# Patient Record
Sex: MALE | Employment: FULL TIME | URBAN - METROPOLITAN AREA
[De-identification: names, ages, dates, MRNs, and addresses within clinical notes are randomized per-mention and may not be internally consistent; named-entity substitution may affect disease eponyms.]

---

## 2021-02-11 ENCOUNTER — HOSPITAL ENCOUNTER (OUTPATIENT)
Dept: LAB | Age: 46
Discharge: HOME | End: 2021-02-11

## 2021-02-11 DIAGNOSIS — R05.9 COUGH: ICD-10-CM

## 2021-02-11 DIAGNOSIS — R06.2 WHEEZING: ICD-10-CM

## 2021-02-11 DIAGNOSIS — R93.89 ABNORMAL CT OF THE CHEST: ICD-10-CM

## 2021-02-11 DIAGNOSIS — J45.909 UNCOMPLICATED ASTHMA, UNSPECIFIED ASTHMA SEVERITY, UNSPECIFIED WHETHER PERSISTENT: ICD-10-CM

## 2021-02-11 DIAGNOSIS — R06.02 SOB (SHORTNESS OF BREATH): ICD-10-CM

## 2021-02-11 PROCEDURE — 36415 COLL VENOUS BLD VENIPUNCTURE: CPT

## 2021-02-11 PROCEDURE — 86331 IMMUNODIFFUSION OUCHTERLONY: CPT

## 2021-02-11 PROCEDURE — 86606 ASPERGILLUS ANTIBODY: CPT

## 2021-02-23 LAB
Lab: NORMAL
REFERENCE LAB,REFLB: NORMAL
TEST DESCRIPTION:,ATST: NORMAL

## 2022-07-28 ENCOUNTER — OFFICE VISIT (OUTPATIENT)
Dept: ENDOCRINOLOGY | Age: 47
End: 2022-07-28

## 2022-07-28 VITALS
HEART RATE: 73 BPM | OXYGEN SATURATION: 97 % | BODY MASS INDEX: 21.28 KG/M2 | WEIGHT: 152 LBS | DIASTOLIC BLOOD PRESSURE: 72 MMHG | HEIGHT: 71 IN | SYSTOLIC BLOOD PRESSURE: 100 MMHG

## 2022-07-28 DIAGNOSIS — E27.49 SECONDARY ADRENAL INSUFFICIENCY (HCC): ICD-10-CM

## 2022-07-28 DIAGNOSIS — E03.9 PRIMARY HYPOTHYROIDISM: Primary | ICD-10-CM

## 2022-07-28 PROCEDURE — 99244 OFF/OP CNSLTJ NEW/EST MOD 40: CPT | Performed by: INTERNAL MEDICINE

## 2022-07-28 RX ORDER — RIVAROXABAN 20 MG/1
TABLET, FILM COATED ORAL
COMMUNITY
Start: 2022-07-06

## 2022-07-28 RX ORDER — LEVOTHYROXINE SODIUM 88 UG/1
88 TABLET ORAL DAILY
Qty: 90 TABLET | Refills: 3
Start: 2022-07-28 | End: 2022-07-28 | Stop reason: SDUPTHER

## 2022-07-28 RX ORDER — FUROSEMIDE 20 MG/1
TABLET ORAL
COMMUNITY
Start: 2022-06-03 | End: 2022-07-28

## 2022-07-28 RX ORDER — IPRATROPIUM BROMIDE AND ALBUTEROL SULFATE 2.5; .5 MG/3ML; MG/3ML
3 SOLUTION RESPIRATORY (INHALATION) EVERY 6 HOURS PRN
COMMUNITY
Start: 2022-05-20 | End: 2022-07-28

## 2022-07-28 RX ORDER — PANTOPRAZOLE SODIUM 40 MG/1
40 TABLET, DELAYED RELEASE ORAL DAILY
COMMUNITY
Start: 2022-06-03 | End: 2022-10-20

## 2022-07-28 RX ORDER — SULFAMETHOXAZOLE AND TRIMETHOPRIM 800; 160 MG/1; MG/1
TABLET ORAL
COMMUNITY
Start: 2022-07-11 | End: 2022-10-20

## 2022-07-28 RX ORDER — ONDANSETRON HYDROCHLORIDE 8 MG/1
8 TABLET, FILM COATED ORAL EVERY 8 HOURS
COMMUNITY
Start: 2022-07-20 | End: 2022-07-28

## 2022-07-28 RX ORDER — PREDNISONE 20 MG/1
TABLET ORAL
COMMUNITY
Start: 2022-07-11 | End: 2022-07-28 | Stop reason: SDUPTHER

## 2022-07-28 RX ORDER — UMECLIDINIUM BROMIDE AND VILANTEROL TRIFENATATE 62.5; 25 UG/1; UG/1
POWDER RESPIRATORY (INHALATION)
COMMUNITY
Start: 2022-07-06 | End: 2022-10-20

## 2022-07-28 RX ORDER — FERROUS SULFATE 325(65) MG
325 TABLET ORAL
COMMUNITY
Start: 2022-06-03 | End: 2022-10-20

## 2022-07-28 RX ORDER — LEVOTHYROXINE SODIUM 88 MCG
TABLET ORAL
COMMUNITY
Start: 2022-07-06 | End: 2022-07-28 | Stop reason: SDUPTHER

## 2022-07-28 RX ORDER — PREDNISONE 20 MG/1
40 TABLET ORAL DAILY
Qty: 180 TABLET | Refills: 3
Start: 2022-07-28 | End: 2022-10-20

## 2022-07-28 RX ORDER — LEVOTHYROXINE SODIUM 88 UG/1
88 TABLET ORAL DAILY
Qty: 90 TABLET | Refills: 3 | Status: SHIPPED | OUTPATIENT
Start: 2022-07-28 | End: 2022-10-20 | Stop reason: SDUPTHER

## 2022-07-28 ASSESSMENT — ENCOUNTER SYMPTOMS
ABDOMINAL PAIN: 0
EYE PAIN: 0
DIARRHEA: 0
WHEEZING: 0
COUGH: 0
TROUBLE SWALLOWING: 0
SHORTNESS OF BREATH: 0
CONSTIPATION: 0
VOMITING: 0
VOICE CHANGE: 0

## 2022-07-28 NOTE — PROGRESS NOTES
Alvaro Kaplan MD, [facility]  [address], [address] [facility],5Th [address]  [place], [address]  Phone [phone]  Facsimile [phone]          Larry Nguyen is a 55 y.o. male seen 7/28/2022 at the request of Dr. Ni Davila for the evaluation of adrenal insufficiency        ASSESSMENT AND PLAN:    1. 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 He is currently being treated with high-dose prednisone, although his dose was recently decreased from 60 mg daily to 40 mg daily. He has developed secondary adrenal insufficiency due to suppression of the hypothalamic-pituitary-adrenal axis. His rheumatologist is currently tapering his prednisone. We discussed the difference between pharmacologic treatment of inflammatory disorders and physiologic glucocorticoid replacement. He is currently on a supraphysiologic dose and we discussed that my role will be to ensure that he is on physiologic glucocorticoid replacement as his prednisone is tapered further. I am certainly also happy to assist in weaning his steroids off if his rheumatologist feels that his prednisone can be discontinued in the future. I will have him return in 2 months. - predniSONE (DELTASONE) 20 MG tablet; Take 2 tablets by mouth in the morning. Dispense: 180 tablet; Refill: 3    2. Primary hypothyroidism  I will assess his thyroid function and let him know if his dose needs to be adjusted. - levothyroxine (SYNTHROID) 88 MCG tablet; Take 1 tablet by mouth in the morning. Dispense: 90 tablet; Refill: 3      Follow-up and Dispositions    Return in about 2 months (around 9/28/2022), or Friday afternoon is okay. 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   He is followed by rheumatology at [address] [facility] (Dr. Nichole Keane). Symptoms: Denies significant weight loss or weight gain. His energy level has improved. Denies nausea, vomiting, anorexia, headaches, myalgias, bowel dysfunction. He reports some issues with hypotension and orthostatic dizziness. He is followed by cardiology at [address] [facility]. Treatment: He currently takes prednisone 40 mg daily. He is also receiving Cytoxan every 3 weeks. Imaging:  CT abdomen/pelvis without contrast 5/27/2022: Unremarkable adrenal glands. Labs:  5/28/2022: Cortisol 3.7.  6/17/2022: Cortisol 1.4, ACTH <1.5. Education:  Sick day rules have been reviewed. Patient has been advised to wear a Med Alert bracelet. THYROID DISEASE    Presentation/Diagnosis: Hypothyroidism diagnosed in 5/2022. Symptoms: See review of systems. Treatment: Takes generic in AM correctly. Imaging: None. Labs:  5/27/2022: TSH 7.41, free T4 0.69.  5/29/2022: TSH 11.74, free T4 0.79, free T3 1.7.  6/17/2022: TSH 0.553, thyroid peroxidase antibodies <8.. Past Medical History:   Diagnosis Date    Ascites     Asthma     Eosinophilic granulomatosis with polyangiitis (EGPA) (HCC)     Pericardial effusion     Primary hypothyroidism        Past Surgical History:   Procedure Laterality Date    WISDOM TOOTH EXTRACTION         Allergies:  Bee venom    Medications:  Reviewed in chart.     Social History     Socioeconomic History    Marital status:      Spouse name: Not on file    Number of children: Not on file    Years of education: Not on file    Highest education level: Not on file   Occupational History    Not on file   Tobacco Use    Smoking status: Never    Smokeless tobacco: Never   Substance and Sexual Activity    Alcohol use: Not on file    Drug use: Not on file    Sexual activity: Not on file   Other Topics Concern    Not on file   Social History Narrative    Not on file     Social Determinants of Health     Financial Resource Strain: Not on file Food Insecurity: Not on file   Transportation Needs: Not on file   Physical Activity: Not on file   Stress: Not on file   Social Connections: Not on file   Intimate Partner Violence: Not on file   Housing Stability: Not on file       Family History   Problem Relation Age of Onset    Thyroid Disease Neg Hx        Review of Systems   Constitutional:  Negative for diaphoresis, fatigue and unexpected weight change. HENT:  Negative for trouble swallowing and voice change. Eyes:  Negative for pain and visual disturbance. Respiratory:  Negative for cough, shortness of breath and wheezing. Cardiovascular:  Negative for chest pain, palpitations and leg swelling. Gastrointestinal:  Negative for abdominal pain, constipation, diarrhea and vomiting. He has some reflux from prednisone. Endocrine: Negative for cold intolerance, heat intolerance, polydipsia, polyphagia and polyuria. Genitourinary:  Negative for difficulty urinating and flank pain. Musculoskeletal:  Negative for arthralgias and myalgias. Neurological:  Negative for dizziness, tremors, weakness, light-headedness, numbness and headaches. Hematological:  Negative for adenopathy. Psychiatric/Behavioral:  Positive for sleep disturbance. Negative for dysphoric mood. The patient is not nervous/anxious. Vital Signs:  /72   Pulse 73   Ht 5' 11\" (1.803 m)   Wt 152 lb (68.9 kg)   SpO2 97%   BMI 21.20 kg/m²     Physical Exam  Constitutional:       General: He is not in acute distress. HENT:      Head: Normocephalic and atraumatic. Eyes:      Comments: No proptosis. Neck:      Thyroid: No thyroid mass or thyromegaly. Cardiovascular:      Rate and Rhythm: Normal rate and regular rhythm. Heart sounds: No murmur heard. Pulmonary:      Effort: Pulmonary effort is normal.      Breath sounds: Normal breath sounds. Abdominal:      General: Bowel sounds are normal.      Palpations: Abdomen is soft.       Tenderness: no abdominal tenderness   Musculoskeletal:         General: Normal range of motion. Lymphadenopathy:      Cervical: No cervical adenopathy. Skin:     General: Skin is warm and dry. Neurological:      General: No focal deficit present. Motor: No tremor. Psychiatric:         Mood and Affect: Mood normal.           Orders Placed This Encounter   Procedures    TSH with Reflex     Standing Status:   Future     Standing Expiration Date:   0/58/3997    Basic Metabolic Panel     Standing Status:   Future     Standing Expiration Date:   7/28/2023         Current Outpatient Medications   Medication Sig Dispense Refill    dapagliflozin (FARXIGA) 10 MG tablet Take 1 tablet by mouth every morning      ferrous sulfate (IRON 325) 325 (65 Fe) MG tablet Take 325 mg by mouth every 48 hours      pantoprazole (PROTONIX) 40 MG tablet Take 40 mg by mouth in the morning. XARELTO 20 MG TABS tablet TAKE 1 TABLET BY MOUTH EVERY DAY      sulfamethoxazole-trimethoprim (BACTRIM DS;SEPTRA DS) 800-160 MG per tablet TAKE 1 TABLET BY MOUTH three TIMES WEEKLY      ANORO ELLIPTA 62.5-25 MCG/INH AEPB inhaler INHALE 1 PUFF BY MOUTH ONCE DAILY AT THE SAME TIME EACH DAY      Ascorbic Acid (VITAMIN C PO) Take by mouth      ZINC PO Take by mouth      Multiple Vitamin (MULTIVITAMIN PO) Take by mouth      GARLIC PO Take by mouth      OLIVE LEAF EXTRACT PO Take by mouth      GRAPE SEED EXTRACT PO Take by mouth      predniSONE (DELTASONE) 20 MG tablet Take 2 tablets by mouth in the morning. 180 tablet 3    levothyroxine (SYNTHROID) 88 MCG tablet Take 1 tablet by mouth in the morning. 90 tablet 3     No current facility-administered medications for this visit.

## 2022-08-03 ENCOUNTER — TELEPHONE (OUTPATIENT)
Dept: ENDOCRINOLOGY | Age: 47
End: 2022-08-03

## 2022-08-03 NOTE — TELEPHONE ENCOUNTER
Patient requested lab order be sent to [facility] fax number [phone] and they have received his order.

## 2022-10-20 ENCOUNTER — OFFICE VISIT (OUTPATIENT)
Dept: ENDOCRINOLOGY | Age: 47
End: 2022-10-20

## 2022-10-20 VITALS
WEIGHT: 161 LBS | BODY MASS INDEX: 22.54 KG/M2 | SYSTOLIC BLOOD PRESSURE: 86 MMHG | HEART RATE: 96 BPM | HEIGHT: 71 IN | DIASTOLIC BLOOD PRESSURE: 58 MMHG | OXYGEN SATURATION: 97 %

## 2022-10-20 DIAGNOSIS — E27.49 SECONDARY ADRENAL INSUFFICIENCY (HCC): Primary | ICD-10-CM

## 2022-10-20 DIAGNOSIS — E03.9 PRIMARY HYPOTHYROIDISM: ICD-10-CM

## 2022-10-20 PROCEDURE — 99214 OFFICE O/P EST MOD 30 MIN: CPT | Performed by: INTERNAL MEDICINE

## 2022-10-20 RX ORDER — ALBUTEROL SULFATE 90 UG/1
AEROSOL, METERED RESPIRATORY (INHALATION)
COMMUNITY
Start: 2022-09-16

## 2022-10-20 RX ORDER — PREDNISONE 1 MG/1
10 TABLET ORAL
COMMUNITY
Start: 2022-09-16 | End: 2022-10-20 | Stop reason: SDUPTHER

## 2022-10-20 RX ORDER — LEVOTHYROXINE SODIUM 88 MCG
88 TABLET ORAL DAILY
COMMUNITY
End: 2022-10-20

## 2022-10-20 RX ORDER — SPIRONOLACTONE 25 MG/1
TABLET ORAL
COMMUNITY
Start: 2022-08-05

## 2022-10-20 RX ORDER — LEVOTHYROXINE SODIUM 88 UG/1
88 TABLET ORAL DAILY
Qty: 90 TABLET | Refills: 3 | Status: SHIPPED | OUTPATIENT
Start: 2022-10-20

## 2022-10-20 RX ORDER — PREDNISONE 1 MG/1
10 TABLET ORAL DAILY
Qty: 180 TABLET | Refills: 3
Start: 2022-10-20

## 2022-10-20 RX ORDER — FLUTICASONE FUROATE, UMECLIDINIUM BROMIDE AND VILANTEROL TRIFENATATE 200; 62.5; 25 UG/1; UG/1; UG/1
POWDER RESPIRATORY (INHALATION)
COMMUNITY
Start: 2022-09-07

## 2022-10-20 ASSESSMENT — ENCOUNTER SYMPTOMS: SHORTNESS OF BREATH: 1

## 2022-10-20 NOTE — PROGRESS NOTES
Alvaro Peters MD, [facility] Endocrinology and Thyroid [facility]od[facility]  [address], [address] [facility],5Th Floor  [place], [address]  Phone [phone]          Tramaine Storey is a 55 y.o. male seen 10/20/2022 for follow up of adrenal insufficiency and hypothyroidism        ASSESSMENT AND PLAN:    1. 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 He has been treated with high-dose prednisone and developed secondary adrenal insufficiency due to suppression of the hypothalamic-pituitary-adrenal axis. His rheumatologist is tapering his prednisone and he is currently taking 10 mg daily. He is scheduled to decrease his prednisone to 5 mg daily in a couple of weeks. He states that his rheumatologist is going to have him try stopping his prednisone after he has been on 5 mg daily for 3 weeks. Once he stops prednisone, I will check an 8 AM cortisol and ACTH level to ensure that his hypothalamic hypopituitary-adrenal axis has recovered. We have discussed the difference between pharmacologic treatment of inflammatory disorders and physiologic glucocorticoid replacement. - predniSONE (DELTASONE) 5 MG tablet; Take 2 tablets by mouth daily  Dispense: 180 tablet; Refill: 3    2. Primary hypothyroidism  Biochemically euthyroid 8/2022. He is interested in stopping thyroid hormone replacement if possible and we discussed that this can be addressed after he gets off prednisone.    - levothyroxine (SYNTHROID) 88 MCG tablet; Take 1 tablet by mouth Daily  Dispense: 90 tablet; Refill: 3      Follow-up and Dispositions    Return in about 6 weeks (around 12/1/2022), or Friday afternoon is okay. 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   He is followed by rheumatology at [address] (Dr. Jia Condon). Symptoms: Weight increased 9 pounds since last visit. His energy level is good overall. He is not sleeping well. Denies nausea, vomiting, anorexia, headaches, myalgias, bowel dysfunction. He reports some issues with hypotension and orthostatic dizziness. He has some dizziness after Cytoxan infusions. He is followed by cardiology in [place]. Treatment: He currently takes prednisone 10 mg daily. He will decrease prednisone to 5 mg daily in 2 weeks and he is scheduled to take it for 3 weeks. He is also receiving Cytoxan every 3 weeks. Imaging:  CT abdomen/pelvis without contrast 5/27/2022: Unremarkable adrenal glands. Labs:  5/28/2022: Cortisol 3.7.  6/17/2022: Cortisol 1.4, ACTH <1.5.  8/3/2022: Sodium 142, potassium 4.8, creatinine 0.84 (). 10/12/2022: Sodium 145, potassium 4.1, creatinine 1.0 (GFR >60), glucose 112. Education:  Sick day rules have been reviewed. Patient has been advised to wear a Med Alert bracelet. THYROID DISEASE    Presentation/Diagnosis: Hypothyroidism diagnosed in 5/2022. Symptoms: See review of systems. Treatment: Takes generic in AM correctly. Imaging: None. Labs:  5/27/2022: TSH 7.41, free T4 0.69.  5/29/2022: TSH 11.74, free T4 0.79, free T3 1.7.  6/17/2022: TSH 0.553, thyroid peroxidase antibodies <8.  8/3/2022: TSH 0.544. Review of Systems   Respiratory:  Positive for shortness of breath ([place]RIGUEZ). Cardiovascular:  Negative for chest pain and leg swelling. Vital Signs:  BP (!) 86/58   Pulse 96   Ht 5' 11\" (1.803 m)   Wt 161 lb (73 kg)   SpO2 97%   BMI 22.45 kg/m²     Wt Readings from Last 3 Encounters:   10/20/22 161 lb (73 kg)   07/28/22 152 lb (68.9 kg)   03/09/21 157 lb 9.6 oz (71.5 kg)       Physical Exam  Constitutional:       General: He is not in acute distress. Neck:      Thyroid: No thyroid mass or thyromegaly.    Cardiovascular:      Rate and Rhythm: Normal rate and regular rhythm. Lymphadenopathy:      Cervical: No cervical adenopathy. Neurological:      Motor: No tremor. Orders Placed This Encounter   Procedures    Cortisol AM, Total     Standing Status:   Future     Standing Expiration Date:   10/20/2023    ACTH     Standing Status:   Future     Standing Expiration Date:   67/20/6514    Basic Metabolic Panel     Standing Status:   Future     Standing Expiration Date:   10/20/2023    TSH     Standing Status:   Future     Standing Expiration Date:   10/20/2023    T4, Free     Standing Status:   Future     Standing Expiration Date:   10/20/2023           Current Outpatient Medications   Medication Sig Dispense Refill    albuterol sulfate HFA (PROVENTIL;VENTOLIN;PROAIR) 108 (90 Base) MCG/ACT inhaler INHALE 2 PUFFS INTO THE LUNGS EVERY 4 HOURS AS NEEDED FOR SHORTNESS OF BREATH      TRELEGY ELLIPTA 200-62.5-25 MCG/INH AEPB INHALE 1 PUFF BY MOUTH ONCE DAILY AT THE SAME TIME EACH DAY (STOP Anoro)      spironolactone (ALDACTONE) 25 MG tablet TAKE 1/2 TABLET BY MOUTH DAILY      predniSONE (DELTASONE) 5 MG tablet Take 2 tablets by mouth daily 180 tablet 3    levothyroxine (SYNTHROID) 88 MCG tablet Take 1 tablet by mouth Daily 90 tablet 3    dapagliflozin (FARXIGA) 10 MG tablet Take 1 tablet by mouth every morning      XARELTO 20 MG TABS tablet TAKE 1 TABLET BY MOUTH EVERY DAY      Ascorbic Acid (VITAMIN C PO) Take by mouth      ZINC PO Take by mouth      GARLIC PO Take by mouth      OLIVE LEAF EXTRACT PO Take by mouth      GRAPE SEED EXTRACT PO Take by mouth       No current facility-administered medications for this visit.

## 2023-01-04 ENCOUNTER — OFFICE VISIT (OUTPATIENT)
Dept: ENDOCRINOLOGY | Age: 48
End: 2023-01-04

## 2023-01-04 VITALS
HEART RATE: 75 BPM | SYSTOLIC BLOOD PRESSURE: 104 MMHG | BODY MASS INDEX: 22.82 KG/M2 | HEIGHT: 71 IN | WEIGHT: 163 LBS | OXYGEN SATURATION: 99 % | DIASTOLIC BLOOD PRESSURE: 62 MMHG

## 2023-01-04 DIAGNOSIS — E27.49 SECONDARY ADRENAL INSUFFICIENCY (HCC): ICD-10-CM

## 2023-01-04 DIAGNOSIS — E03.9 PRIMARY HYPOTHYROIDISM: ICD-10-CM

## 2023-01-04 PROCEDURE — 99214 OFFICE O/P EST MOD 30 MIN: CPT | Performed by: INTERNAL MEDICINE

## 2023-01-04 RX ORDER — AZATHIOPRINE 50 MG/1
100 TABLET ORAL DAILY
COMMUNITY

## 2023-01-04 RX ORDER — MEPOLIZUMAB 100 MG/ML
300 INJECTION, SOLUTION SUBCUTANEOUS ONCE
COMMUNITY

## 2023-01-04 RX ORDER — LEVOTHYROXINE SODIUM 88 UG/1
88 TABLET ORAL DAILY
Qty: 90 TABLET | Refills: 3 | Status: SHIPPED | OUTPATIENT
Start: 2023-01-04

## 2023-01-04 RX ORDER — FLUTICASONE PROPIONATE 93 UG/1
1 SPRAY, METERED NASAL 2 TIMES DAILY
COMMUNITY

## 2023-01-04 RX ORDER — PREDNISONE 1 MG/1
7.5 TABLET ORAL DAILY
Qty: 135 TABLET | Refills: 3
Start: 2023-01-04

## 2023-01-04 ASSESSMENT — ENCOUNTER SYMPTOMS
COUGH: 1
RHINORRHEA: 1
SHORTNESS OF BREATH: 0

## 2023-01-04 NOTE — PROGRESS NOTES
Alvaro Hood MD, [facility] Endocrinology and Thy[facility]odule [facility]  [address], [address] [facility],5[address] Floor  [place], [address]  Phone [phone]          Silvana Chavez is a 52 y.o. male seen 1/4/2023 for follow up of adrenal insufficiency and hypothyroidism        ASSESSMENT AND PLAN:    1. 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 He has been treated with high-dose prednisone and developed secondary adrenal insufficiency due to suppression of the hypothalamic-pituitary-adrenal axis. His rheumatologist is tapering his prednisone and he is currently taking 7.5 mg daily. Once he is taking prednisone 5 mg daily, I will check an 8 AM cortisol and ACTH level to ensure that his hypothalamic hypopituitary-adrenal axis has recovered. He was instructed to hold his prednisone the morning of his labs. We have discussed the difference between pharmacologic treatment of inflammatory disorders and physiologic glucocorticoid replacement. - predniSONE (DELTASONE) 5 MG tablet; Take 1.5 tablets by mouth daily  Dispense: 135 tablet; Refill: 3    2. Primary hypothyroidism  Biochemically euthyroid 8/2022. He is interested in stopping thyroid hormone replacement if possible and we discussed that this can be addressed after he gets off prednisone.    - levothyroxine (SYNTHROID) 88 MCG tablet; Take 1 tablet by mouth Daily  Dispense: 90 tablet; Refill: 3      Follow-up and Dispositions    Return in about 3 months (around 4/4/2023), or Friday afternoon is okay. 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 He is followed by rheumatology at [facility] (Dr. Augusto Vieira). Symptoms: Weight increased 2 pounds since last visit.   Denies fatigue, nausea, vomiting, anorexia, headaches, myalgias, bowel dysfunction, hypotension, orthostatic dizziness. He is followed by cardiology in [facility]. Treatment: He currently takes prednisone 7.5 mg daily (2-3 weeks). He stopped Cytoxan. He is now on azathioprine and Nucala injections. Imaging:  CT abdomen/pelvis without contrast 5/27/2022: Unremarkable adrenal glands. Labs:  5/28/2022: Cortisol 3.7.  6/17/2022: Cortisol 1.4, ACTH <1.5.  8/3/2022: Sodium 142, potassium 4.8, creatinine 0.84 (). 10/12/2022: Sodium 145, potassium 4.1, creatinine 1.0 (GFR >60), glucose 112. Education:  Sick day rules have been reviewed. Patient has been advised to wear a Med Alert bracelet. THYROID DISEASE    Presentation/Diagnosis: Hypothyroidism diagnosed in 5/2022. Symptoms: See review of systems. Treatment: Takes generic in AM correctly. Imaging: None. Labs:  5/27/2022: TSH 7.41, free T4 0.69.  5/29/2022: TSH 11.74, free T4 0.79, free T3 1.7.  6/17/2022: TSH 0.553, thyroid peroxidase antibodies <8.  8/3/2022: TSH 0.544. Review of Systems   HENT:  Positive for rhinorrhea. Respiratory:  Positive for cough. Negative for shortness of breath. Cardiovascular:  Negative for chest pain and leg swelling. Vital Signs:  /62   Pulse 75   Ht 5' 11\" (1.803 m)   Wt 163 lb (73.9 kg)   SpO2 99%   BMI 22.73 kg/m²     Wt Readings from Last 3 Encounters:   01/04/23 163 lb (73.9 kg)   10/20/22 161 lb (73 kg)   07/28/22 152 lb (68.9 kg)       Physical Exam  Constitutional:       General: He is not in acute distress. Neck:      Thyroid: No thyroid mass or thyromegaly. Cardiovascular:      Rate and Rhythm: Normal rate and regular rhythm. Lymphadenopathy:      Cervical: No cervical adenopathy. Neurological:      Motor: No tremor. No orders of the defined types were placed in this encounter.           Current Outpatient Medications   Medication Sig Dispense Refill    mepolizumab (NUCALA) 100 MG/ML SOAJ injection Inject 300 mg into the skin once Indications: every 4 weeks      azaTHIOprine (IMURAN) 50 MG tablet Take 100 mg by mouth daily      Fluticasone Propionate (XHANCE) 93 MCG/ACT EXHU 1 puff by Nasal route 2 times daily      predniSONE (DELTASONE) 5 MG tablet Take 1.5 tablets by mouth daily 135 tablet 3    levothyroxine (SYNTHROID) 88 MCG tablet Take 1 tablet by mouth Daily 90 tablet 3    albuterol sulfate HFA (PROVENTIL;VENTOLIN;PROAIR) 108 (90 Base) MCG/ACT inhaler INHALE 2 PUFFS INTO THE LUNGS EVERY 4 HOURS AS NEEDED FOR SHORTNESS OF BREATH      TRELEGY ELLIPTA 200-62.5-25 MCG/INH AEPB INHALE 1 PUFF BY MOUTH ONCE DAILY AT THE SAME TIME EACH DAY (STOP Anoro)      spironolactone (ALDACTONE) 25 MG tablet TAKE 1/2 TABLET BY MOUTH DAILY      dapagliflozin (FARXIGA) 10 MG tablet Take 1 tablet by mouth every morning      XARELTO 20 MG TABS tablet TAKE 1 TABLET BY MOUTH EVERY DAY      Ascorbic Acid (VITAMIN C PO) Take by mouth      ZINC PO Take by mouth      GARLIC PO Take by mouth      OLIVE LEAF EXTRACT PO Take by mouth      GRAPE SEED EXTRACT PO Take by mouth       No current facility-administered medications for this visit.

## 2023-02-09 LAB
ACTH PLAS-MCNC: 21.5 PG/ML (ref 7.2–63.3)
BUN SERPL-MCNC: 17 MG/DL (ref 6–24)
BUN/CREAT SERPL: 18 (ref 9–20)
CALCIUM SERPL-MCNC: 9.5 MG/DL (ref 8.7–10.2)
CHLORIDE SERPL-SCNC: 102 MMOL/L (ref 96–106)
CO2 SERPL-SCNC: 26 MMOL/L (ref 20–29)
CORTIS AM PEAK SERPL-MCNC: 8.7 UG/DL (ref 6.2–19.4)
CREAT SERPL-MCNC: 0.94 MG/DL (ref 0.76–1.27)
EGFRCR SERPLBLD CKD-EPI 2021: 101 ML/MIN/1.73
GLUCOSE SERPL-MCNC: 73 MG/DL (ref 70–99)
POTASSIUM SERPL-SCNC: 4.3 MMOL/L (ref 3.5–5.2)
SODIUM SERPL-SCNC: 143 MMOL/L (ref 134–144)
T4 FREE SERPL-MCNC: 1.23 NG/DL (ref 0.82–1.77)
TSH SERPL DL<=0.005 MIU/L-ACNC: 1.86 UIU/ML (ref 0.45–4.5)

## 2023-05-08 ENCOUNTER — TELEPHONE (OUTPATIENT)
Dept: ENDOCRINOLOGY | Age: 48
End: 2023-05-08

## 2023-05-08 ENCOUNTER — OFFICE VISIT (OUTPATIENT)
Dept: ENDOCRINOLOGY | Age: 48
End: 2023-05-08

## 2023-05-08 VITALS
DIASTOLIC BLOOD PRESSURE: 80 MMHG | BODY MASS INDEX: 23.43 KG/M2 | HEART RATE: 75 BPM | SYSTOLIC BLOOD PRESSURE: 108 MMHG | WEIGHT: 168 LBS | OXYGEN SATURATION: 98 %

## 2023-05-08 DIAGNOSIS — E27.49 SECONDARY ADRENAL INSUFFICIENCY (HCC): Primary | ICD-10-CM

## 2023-05-08 DIAGNOSIS — E03.9 PRIMARY HYPOTHYROIDISM: ICD-10-CM

## 2023-05-08 DIAGNOSIS — E27.49 SECONDARY ADRENAL INSUFFICIENCY (HCC): ICD-10-CM

## 2023-05-08 LAB
ANION GAP SERPL CALC-SCNC: 5 MMOL/L (ref 2–11)
BUN SERPL-MCNC: 21 MG/DL (ref 6–23)
CALCIUM SERPL-MCNC: 9 MG/DL (ref 8.3–10.4)
CHLORIDE SERPL-SCNC: 105 MMOL/L (ref 101–110)
CO2 SERPL-SCNC: 29 MMOL/L (ref 21–32)
CORTIS AM PEAK SERPL-MCNC: 4.3 UG/DL (ref 7–25)
CREAT SERPL-MCNC: 1.1 MG/DL (ref 0.8–1.5)
GLUCOSE SERPL-MCNC: 81 MG/DL (ref 65–100)
POTASSIUM SERPL-SCNC: 3.7 MMOL/L (ref 3.5–5.1)
SODIUM SERPL-SCNC: 139 MMOL/L (ref 133–143)
TSH W FREE THYROID IF ABNORMAL: 0.99 UIU/ML (ref 0.36–3.74)

## 2023-05-08 PROCEDURE — 99214 OFFICE O/P EST MOD 30 MIN: CPT | Performed by: INTERNAL MEDICINE

## 2023-05-08 RX ORDER — LEVOTHYROXINE SODIUM 88 UG/1
88 TABLET ORAL DAILY
Qty: 90 TABLET | Refills: 3 | Status: SHIPPED | OUTPATIENT
Start: 2023-05-08

## 2023-05-08 RX ORDER — COSYNTROPIN 0.25 MG/ML
0.25 INJECTION, POWDER, FOR SOLUTION INTRAMUSCULAR; INTRAVENOUS ONCE
Qty: 250 MCG | Refills: 0 | Status: SHIPPED | OUTPATIENT
Start: 2023-05-08 | End: 2023-05-08

## 2023-05-08 NOTE — PROGRESS NOTES
Alvaro Mohan MD, [facility] Endocrinology and Thyroid Nodule Clinic  [address], [address] [facility],5[address]  [place], [address]  Phone [phone]          Nelson Vera is a 52 y.o. male seen 5/8/2023 for follow up of adrenal insufficiency and hypothyroidism        ASSESSMENT AND PLAN:    1. 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 He has been treated with high-dose prednisone and developed secondary adrenal insufficiency due to suppression of the hypothalamic-pituitary-adrenal axis. His rheumatologist tapered his prednisone and ultimately stopped it around 2 weeks ago. He currently has no signs or symptoms of adrenal insufficiency. I will check a cortisol and ACTH level to ensure that his hypothalamic hypopituitary-adrenal axis has recovered. 2. Primary hypothyroidism  Biochemically euthyroid 2/2023. He is interested in stopping thyroid hormone replacement if possible and I may try to taper it once I review his labs. - levothyroxine (SYNTHROID) 88 MCG tablet; Take 1 tablet by mouth Daily  Dispense: 90 tablet; Refill: 3      Follow-up and Dispositions    Return in about 3 months (around 8/8/2023), or Friday afternoon is okay. 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 He is followed by rheumatology at [address] (Dr. Ramila Arita). Symptoms: Weight increased 5 pounds since last visit. Denies fatigue, nausea, vomiting, anorexia, headaches. He reports some myalgias, which he relates to increased activity. Denies bowel dysfunction, hypotension, orthostatic dizziness. He is followed by cardiology in [place]. Treatment: Prednisone stopped by rheumatology at his visit 4/28/2023.      Imaging:  CT abdomen/pelvis without

## 2023-05-09 LAB — ACTH PLAS-MCNC: 15.9 PG/ML (ref 7.2–63.3)

## 2023-05-18 NOTE — TELEPHONE ENCOUNTER
Spoke to the patients wife and she expressed understanding.  And is setting up a nurse visit for the cosyntropin stimulation test.

## 2023-05-26 ENCOUNTER — NURSE ONLY (OUTPATIENT)
Dept: ENDOCRINOLOGY | Age: 48
End: 2023-05-26

## 2023-05-26 DIAGNOSIS — E27.49 SECONDARY ADRENAL INSUFFICIENCY (HCC): Primary | ICD-10-CM

## 2023-05-26 DIAGNOSIS — E27.49 SECONDARY ADRENAL INSUFFICIENCY (HCC): ICD-10-CM

## 2023-05-26 LAB — CORTIS 30M P CHAL SERPL-MCNC: 21.9 UG/DL

## 2023-05-26 PROCEDURE — 96374 THER/PROPH/DIAG INJ IV PUSH: CPT | Performed by: INTERNAL MEDICINE

## 2023-05-26 RX ORDER — COSYNTROPIN 0.25 MG/ML
0.25 INJECTION, POWDER, FOR SOLUTION INTRAMUSCULAR; INTRAVENOUS ONCE
Status: COMPLETED | OUTPATIENT
Start: 2023-05-26 | End: 2023-05-26

## 2023-05-26 RX ADMIN — COSYNTROPIN 0.25 MG: 0.25 INJECTION, POWDER, FOR SOLUTION INTRAMUSCULAR; INTRAVENOUS at 08:30

## 2023-05-26 NOTE — PROGRESS NOTES
Patient had an appointment today for cosyntropin stimulation test.   Patient picked up the Cosyntropin rx from the [facility]. Medication was reconstituted with 1mL of sodium chloride. IM injection given in the left gluteus ginger of 1mL Cosyntropin. Patient tolerated injection well. Patient was in observation for 15 minutes to ensure no allergic reaction happened. Patient had a baseline cortisol before injection  Cosyntropin injection given at 8:30am  Patient will need to have a blood draw at 9:00am and then again at 9:30am.  Patient was made aware of all steps.

## 2023-05-27 LAB
CORTIS 1H P CHAL SERPL-MCNC: 24.7 UG/DL
CORTIS BS SERPL-MCNC: 7.2 UG/DL

## 2023-08-08 ENCOUNTER — OFFICE VISIT (OUTPATIENT)
Dept: ENDOCRINOLOGY | Age: 48
End: 2023-08-08

## 2023-08-08 VITALS
SYSTOLIC BLOOD PRESSURE: 100 MMHG | BODY MASS INDEX: 23.43 KG/M2 | DIASTOLIC BLOOD PRESSURE: 78 MMHG | WEIGHT: 168 LBS | HEART RATE: 68 BPM | OXYGEN SATURATION: 96 %

## 2023-08-08 DIAGNOSIS — E03.9 PRIMARY HYPOTHYROIDISM: Primary | ICD-10-CM

## 2023-08-08 DIAGNOSIS — E27.49 SECONDARY ADRENAL INSUFFICIENCY (HCC): ICD-10-CM

## 2023-08-08 PROCEDURE — 99214 OFFICE O/P EST MOD 30 MIN: CPT | Performed by: INTERNAL MEDICINE

## 2023-08-08 RX ORDER — LEVOTHYROXINE SODIUM 88 UG/1
44 TABLET ORAL DAILY
Qty: 45 TABLET | Refills: 3
Start: 2023-08-08

## 2023-08-08 NOTE — PROGRESS NOTES
Reflex     Standing Status:   Future     Standing Expiration Date:   8/8/2024         Current Outpatient Medications   Medication Sig Dispense Refill    levothyroxine (SYNTHROID) 88 MCG tablet Take 0.5 tablets by mouth Daily 45 tablet 3    mepolizumab (NUCALA) 100 MG/ML SOAJ injection Inject 3 mLs into the skin once Indications: every 4 weeks      azaTHIOprine (IMURAN) 50 MG tablet Take 2 tablets by mouth daily      Fluticasone Propionate (XHANCE) 93 MCG/ACT EXHU 1 puff by Nasal route 2 times daily      albuterol sulfate HFA (PROVENTIL;VENTOLIN;PROAIR) 108 (90 Base) MCG/ACT inhaler INHALE 2 PUFFS INTO THE LUNGS EVERY 4 HOURS AS NEEDED FOR SHORTNESS OF BREATH      TRELEGY ELLIPTA 200-62.5-25 MCG/INH AEPB INHALE 1 PUFF BY MOUTH ONCE DAILY AT THE SAME TIME EACH DAY (STOP Anoro)      spironolactone (ALDACTONE) 25 MG tablet TAKE 1/2 TABLET BY MOUTH DAILY      XARELTO 20 MG TABS tablet TAKE 1 TABLET BY MOUTH EVERY DAY      Ascorbic Acid (VITAMIN C PO) Take by mouth      ZINC PO Take by mouth      GARLIC PO Take by mouth      OLIVE LEAF EXTRACT PO Take by mouth      GRAPE SEED EXTRACT PO Take by mouth      dapagliflozin (FARXIGA) 10 MG tablet Take 1 tablet by mouth every morning       No current facility-administered medications for this visit.

## 2023-09-30 LAB
T4 FREE SERPL-MCNC: 1.09 NG/DL (ref 0.82–1.77)
TSH SERPL DL<=0.005 MIU/L-ACNC: 3.11 UIU/ML (ref 0.45–4.5)

## 2023-10-04 ENCOUNTER — OFFICE VISIT (OUTPATIENT)
Dept: ENDOCRINOLOGY | Age: 48
End: 2023-10-04

## 2023-10-04 VITALS
WEIGHT: 167 LBS | HEIGHT: 71 IN | SYSTOLIC BLOOD PRESSURE: 128 MMHG | BODY MASS INDEX: 23.38 KG/M2 | DIASTOLIC BLOOD PRESSURE: 78 MMHG

## 2023-10-04 DIAGNOSIS — E03.9 PRIMARY HYPOTHYROIDISM: Primary | ICD-10-CM

## 2023-10-04 DIAGNOSIS — E27.49 SECONDARY ADRENAL INSUFFICIENCY (HCC): ICD-10-CM

## 2023-10-04 PROCEDURE — 99214 OFFICE O/P EST MOD 30 MIN: CPT | Performed by: INTERNAL MEDICINE

## 2023-10-04 RX ORDER — LEVOTHYROXINE SODIUM 0.03 MG/1
25 TABLET ORAL DAILY
Qty: 30 TABLET | Refills: 5 | Status: SHIPPED | OUTPATIENT
Start: 2023-10-04 | End: 2023-10-04 | Stop reason: SDUPTHER

## 2023-10-04 RX ORDER — LEVOTHYROXINE SODIUM 0.03 MG/1
25 TABLET ORAL DAILY
Qty: 90 TABLET | Refills: 3 | Status: SHIPPED | OUTPATIENT
Start: 2023-10-04

## 2023-10-04 ASSESSMENT — ENCOUNTER SYMPTOMS
CONSTIPATION: 0
DIARRHEA: 0

## 2023-10-04 NOTE — PROGRESS NOTES
Alvaro Perez MD, [facility]  [address], [address] [facility], [address]  Phone [phone]          Alicia Wilkes is a 52 y.o. male seen 10/4/2023 for follow up of adrenal insufficiency and hypothyroidism        ASSESSMENT AND PLAN:    1. Primary hypothyroidism  He is interested in stopping thyroid hormone replacement if possible. I will have him decrease his levothyroxine to 25 mcg daily as below. I will repeat his thyroid function tests in 2 months. If he is euthyroid, then I will stop his levothyroxine. Follow up in 4 months. - levothyroxine (SYNTHROID) 25 MCG tablet; Take 1 tablet by mouth Daily  Dispense: 90 tablet; Refill: 3    2. 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 He has been treated with high-dose prednisone and developed secondary adrenal insufficiency due to suppression of the hypothalamic-pituitary-adrenal axis. His rheumatologist tapered his prednisone and ultimately stopped it 4/2023. Cosyntropin stimulation test was normal 5/2023, indicating that his hypothalamic hypopituitary-adrenal axis has recovered. Follow-up and Dispositions    Return in about 4 months (around 2/4/2024), or Friday afternoon is okay. 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 He is followed by rheumatology at [address] (Dr. Jay Le). Symptoms: See review of systems. Treatment: Prednisone stopped by rheumatology at his visit 4/28/2023. Imaging:  CT abdomen/pelvis without contrast 5/27/2022: Unremarkable adrenal glands. Labs:  5/28/2022: Cortisol 3.7.  6/17/2022: Cortisol 1.4, ACTH <1.5.  8/3/2022: Sodium 142, potassium 4.8, creatinine 0.84 ().   10/12/2022:

## 2023-12-05 ENCOUNTER — PATIENT MESSAGE (OUTPATIENT)
Dept: ENDOCRINOLOGY | Age: 48
End: 2023-12-05

## 2023-12-05 LAB — TSH SERPL DL<=0.005 MIU/L-ACNC: 3.29 UIU/ML (ref 0.45–4.5)

## 2024-02-06 LAB — TSH SERPL DL<=0.005 MIU/L-ACNC: 2.31 UIU/ML (ref 0.45–4.5)

## 2024-02-09 ENCOUNTER — OFFICE VISIT (OUTPATIENT)
Dept: ENDOCRINOLOGY | Age: 49
End: 2024-02-09

## 2024-02-09 VITALS
SYSTOLIC BLOOD PRESSURE: 122 MMHG | DIASTOLIC BLOOD PRESSURE: 84 MMHG | WEIGHT: 170 LBS | BODY MASS INDEX: 23.71 KG/M2 | OXYGEN SATURATION: 97 % | HEART RATE: 62 BPM

## 2024-02-09 DIAGNOSIS — E27.49 SECONDARY ADRENAL INSUFFICIENCY (HCC): ICD-10-CM

## 2024-02-09 DIAGNOSIS — E03.9 PRIMARY HYPOTHYROIDISM: Primary | ICD-10-CM

## 2024-02-09 PROCEDURE — 99214 OFFICE O/P EST MOD 30 MIN: CPT | Performed by: INTERNAL MEDICINE

## 2024-02-09 RX ORDER — FLUTICASONE FUROATE 200 UG/1
POWDER RESPIRATORY (INHALATION)
COMMUNITY
Start: 2023-11-13

## 2024-02-09 ASSESSMENT — ENCOUNTER SYMPTOMS
CONSTIPATION: 0
NAUSEA: 0
DIARRHEA: 0
VOMITING: 0

## 2024-02-09 NOTE — PROGRESS NOTES
Alvaro Negron MD, [facility]  [address], [address]  [place] [address]  Phone [phone]  Facsimile [phone]          Eddie Barba is a 48 y.o. male seen 2/9/2024 for follow up of adrenal insufficiency and hypothyroidism        ASSESSMENT AND PLAN:    1. Primary hypothyroidism  He stopped his levothyroxine in 12/2023.  He is currently clinically and biochemically euthyroid and his hypothyroidism appears to have resolved.  I will release him to his primary care physician.      2. 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  He has been treated with high-dose prednisone and developed secondary adrenal insufficiency due to suppression of the hypothalamic-pituitary-adrenal axis.  His rheumatologist tapered his prednisone and ultimately stopped it 4/2023.  Cosyntropin stimulation test was normal 5/2023, indicating that his hypothalamic hypopituitary-adrenal axis has recovered.        Follow-up and Dispositions    Return As needed.         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  He is followed by rheumatology at [facility] (Dr. Cobian).      Symptoms: See review of systems.    Treatment: Prednisone stopped by rheumatology at his visit 4/28/2023.     Imaging:  CT abdomen/pelvis without contrast 5/27/2022: Unremarkable adrenal glands.    Labs:  5/28/2022: Cortisol 3.7.  6/17/2022: Cortisol 1.4, ACTH <1.5.  8/3/2022: Sodium 142, potassium 4.8, creatinine 0.84 ().  10/12/2022: Sodium 145, potassium 4.1, creatinine 1.0 (GFR >60), glucose 112.  2/8/2023: Cortisol 8.7, ACTH 21.5, sodium 143, potassium 4.3, creatinine 0.94 (), glucose 73.  5/8/2023: Cortisol 4.3, ACTH 15.9, sodium 139, potassium 3.7,